# Patient Record
Sex: MALE | Race: WHITE | Employment: FULL TIME | URBAN - METROPOLITAN AREA
[De-identification: names, ages, dates, MRNs, and addresses within clinical notes are randomized per-mention and may not be internally consistent; named-entity substitution may affect disease eponyms.]

---

## 2017-01-19 ENCOUNTER — TELEPHONE (OUTPATIENT)
Dept: NEUROLOGY | Facility: CLINIC | Age: 42
End: 2017-01-19

## 2017-01-19 NOTE — TELEPHONE ENCOUNTER
Patient with new insurance group and ID number. Group #: [id number]   Member ID#:  [id number]  New prior authorization required for current Copaxone prescription. Current prescription good through Sept. 2017. Clinical questions answered on phone.

## 2017-02-07 ENCOUNTER — TELEPHONE (OUTPATIENT)
Dept: NEUROLOGY | Facility: CLINIC | Age: 42
End: 2017-02-07

## 2017-03-17 ENCOUNTER — OFFICE VISIT (OUTPATIENT)
Dept: NEUROLOGY | Facility: CLINIC | Age: 42
End: 2017-03-17

## 2017-03-17 VITALS
HEIGHT: 68 IN | HEART RATE: 77 BPM | SYSTOLIC BLOOD PRESSURE: 130 MMHG | RESPIRATION RATE: 16 BRPM | WEIGHT: 150 LBS | BODY MASS INDEX: 22.73 KG/M2 | DIASTOLIC BLOOD PRESSURE: 86 MMHG

## 2017-03-17 DIAGNOSIS — G35 MS (MULTIPLE SCLEROSIS) (HCC): Primary | ICD-10-CM

## 2017-03-17 PROCEDURE — 99213 OFFICE O/P EST LOW 20 MIN: CPT | Performed by: OTHER

## 2017-03-17 RX ORDER — GLATIRAMER 40 MG/ML
40 INJECTION, SOLUTION SUBCUTANEOUS
Qty: 12 SYRINGE | Refills: 11 | Status: SHIPPED | OUTPATIENT
Start: 2017-03-17 | End: 2017-08-16

## 2017-03-17 NOTE — PATIENT INSTRUCTIONS
Refill policies:    • Allow 2 business days for refills; controlled substances may take longer.   • Contact your pharmacy at least 5 days prior to running out of medication and have them send an electronic request or submit request through the “request re your physician has recommended that you have a procedure or additional testing performed. [facility]) will contact your insurance carrier to obtain pre-certification or prior authorization.     Unfortunately, NEO has seen an increas

## 2017-03-23 NOTE — PROGRESS NOTES
[id number] [facility] with [facility]  3/17/2017    10:51 AM      Followed for MS    No recurring symptoms  Relates one injection site lump right gluteal area   No problems with injections otherwise  No new sympt diagnosis)    Advised regarding injections  Continue Copaxone  MRI BRAIN (W+WO) (CPT=70553) [[id number]]  MRI SPINE CERVICAL (W+WO) (CPT=72156) [[id number]]      RTC 6 months      Taina Morrissey MD  Vascular & General Neurology  Director, Multiple Sclerosis Pro

## 2017-03-28 ENCOUNTER — TELEPHONE (OUTPATIENT)
Dept: NEUROLOGY | Facility: CLINIC | Age: 42
End: 2017-03-28

## 2017-03-28 NOTE — TELEPHONE ENCOUNTER
Left message at home number, MRI Brain/Cervical ordered by Dr. Pauly Huggins has been approved by your insurance, authorization # [id number] and is approved through 3.27.17-6.25.17. Please have procedure completed before 6.25.17.  Pt will be having test done at

## 2017-04-18 ENCOUNTER — OFFICE VISIT (OUTPATIENT)
Dept: NEUROLOGY | Facility: CLINIC | Age: 42
End: 2017-04-18

## 2017-04-18 VITALS — HEART RATE: 82 BPM | DIASTOLIC BLOOD PRESSURE: 74 MMHG | RESPIRATION RATE: 18 BRPM | SYSTOLIC BLOOD PRESSURE: 122 MMHG

## 2017-04-18 DIAGNOSIS — G35 MS (MULTIPLE SCLEROSIS) (HCC): Primary | ICD-10-CM

## 2017-04-18 DIAGNOSIS — T80.90XD INJECTION SITE REACTION, SUBSEQUENT ENCOUNTER: ICD-10-CM

## 2017-04-18 PROCEDURE — 99214 OFFICE O/P EST MOD 30 MIN: CPT | Performed by: PHYSICIAN ASSISTANT

## 2017-04-18 RX ORDER — METHYLPREDNISOLONE 4 MG/1
TABLET ORAL
Qty: 1 PACKAGE | Refills: 0 | Status: SHIPPED | OUTPATIENT
Start: 2017-04-18 | End: 2017-07-13 | Stop reason: ALTCHOICE

## 2017-04-18 NOTE — PROGRESS NOTES
HPI:    Patient ID: Ijeoma Bowling is a 42year old male. HPI    Patient is a 42year old male here for follow-up of MS. He has been on the copaxone since 2012. He was last seen in the office about a month ago.  At that time MRI's were ordered and he com (VITAMIN D) 2000 UNITS Oral Tab Take 1 tablet by mouth daily. Disp:  Rfl:    Vitamin B-12 (VITAMIN B12) 1000 MCG Oral Tab Take 2,000 mcg by mouth daily.  Disp:  Rfl:    Fluticasone (FLOVENT DISKUS) 50 MCG/BLIST Inhalation Aerosol Powder, Breath Activated In be stable. ASSESSMENT/PLAN:   Ms (multiple sclerosis) (hcc)  (primary encounter diagnosis)  Injection site reaction, subsequent encounter    MS- continue the copaxone. Injection Site Reaction- ESR ordered.  Once lab test is completed can start on t

## 2017-04-18 NOTE — PATIENT INSTRUCTIONS
Refill policies:    • Allow 2 business days for refills; controlled substances may take longer.   • Contact your pharmacy at least 5 days prior to running out of medication and have them send an electronic request or submit request through the “request re insurance carrier to obtain pre-certification or prior authorization. Unfortunately, NEO has seen an increase in denial of payment even though the procedure/test has been pre-certified.   You are strongly encouraged to contact your insurance carrier to v

## 2017-04-19 ENCOUNTER — APPOINTMENT (OUTPATIENT)
Dept: LAB | Age: 42
End: 2017-04-19
Attending: PHYSICIAN ASSISTANT
Payer: COMMERCIAL

## 2017-04-19 DIAGNOSIS — G35 MS (MULTIPLE SCLEROSIS) (HCC): ICD-10-CM

## 2017-04-19 PROCEDURE — 36415 COLL VENOUS BLD VENIPUNCTURE: CPT

## 2017-04-19 PROCEDURE — 85652 RBC SED RATE AUTOMATED: CPT

## 2017-04-20 ENCOUNTER — TELEPHONE (OUTPATIENT)
Dept: NEUROLOGY | Facility: CLINIC | Age: 42
End: 2017-04-20

## 2017-04-20 NOTE — TELEPHONE ENCOUNTER
Called  Dr Luis Kolb office [phone], nurse said she did not check the fax machine for report, she will call us if needed.

## 2017-04-20 NOTE — TELEPHONE ENCOUNTER
Patient informed that ESR was normal. Patient informed that MRI cervical spine did show a lesion on the thyroid gland. Patient instructed to follow-up with pcp for furth recommendations in regards to the lesion on the thyroid gland.  Still waiting on radiol

## 2017-04-21 PROBLEM — E04.1 THYROID NODULE: Status: ACTIVE | Noted: 2017-04-21

## 2017-04-21 PROBLEM — N52.9 ERECTILE DYSFUNCTION, UNSPECIFIED ERECTILE DYSFUNCTION TYPE: Status: ACTIVE | Noted: 2017-04-21

## 2017-04-24 ENCOUNTER — TELEPHONE (OUTPATIENT)
Dept: NEUROLOGY | Facility: CLINIC | Age: 42
End: 2017-04-24

## 2017-04-24 NOTE — TELEPHONE ENCOUNTER
Patient had expressed questions about brain and cervical MRI changes between 2016 and 2017. Patient had most recent MRIs done at [facility]; previous imaging was done at [facility] on 05/27/16. Imaging prior to that was done at [facility].   Comparison imaging currently a

## 2017-04-27 ENCOUNTER — TELEPHONE (OUTPATIENT)
Dept: NEUROLOGY | Facility: CLINIC | Age: 42
End: 2017-04-27

## 2017-04-27 DIAGNOSIS — G35 MS (MULTIPLE SCLEROSIS) (HCC): Primary | ICD-10-CM

## 2017-04-27 RX ORDER — METHYLPREDNISOLONE 4 MG/1
TABLET ORAL
Qty: 1 KIT | Refills: 0 | Status: SHIPPED | OUTPATIENT
Start: 2017-04-27 | End: 2017-09-08 | Stop reason: ALTCHOICE

## 2017-04-27 NOTE — TELEPHONE ENCOUNTER
MDP seemed to help but after completion of dosepak, 3-4 days later, his reaction starting to return (swelling, redness). Also reporting a lingering a headache, likening it to a hangover, for past 2 weeks.  He states that this is usual start to the flare-

## 2017-05-02 ENCOUNTER — TELEPHONE (OUTPATIENT)
Dept: NEUROLOGY | Facility: CLINIC | Age: 42
End: 2017-05-02

## 2017-05-03 ENCOUNTER — TELEPHONE (OUTPATIENT)
Dept: NEUROLOGY | Facility: CLINIC | Age: 42
End: 2017-05-03

## 2017-05-03 NOTE — TELEPHONE ENCOUNTER
Wei Rosado states that he received a voicemail from Kong Martin stating that she dropped the CD of prior MRI to [facility] but it should have gone to [facility]. He states he has never had MRI at [facility], only at [facility] in [facility].     Will discuss next steps wit

## 2017-05-04 ENCOUNTER — TELEPHONE (OUTPATIENT)
Dept: NEUROLOGY | Facility: CLINIC | Age: 42
End: 2017-05-04

## 2017-05-04 NOTE — TELEPHONE ENCOUNTER
Left voice-mail message informing patient that I did receive a revision to his MRI report from [facility] stating that it was compared to the [facility] MRI done may 2016 and it was stable.

## 2017-05-04 NOTE — TELEPHONE ENCOUNTER
See 5/4/17 encounter from with [facility]. [facility] is a [address] [place] [address] facility and the comparative imaging was completed.

## 2017-05-05 ENCOUNTER — APPOINTMENT (OUTPATIENT)
Dept: LAB | Age: 42
End: 2017-05-05
Attending: INTERNAL MEDICINE
Payer: COMMERCIAL

## 2017-05-05 DIAGNOSIS — D35.1 PARATHYROID ADENOMA: ICD-10-CM

## 2017-05-05 DIAGNOSIS — N52.9 ERECTILE DYSFUNCTION, UNSPECIFIED ERECTILE DYSFUNCTION TYPE: ICD-10-CM

## 2017-05-05 DIAGNOSIS — E04.1 THYROID NODULE: ICD-10-CM

## 2017-05-05 PROCEDURE — 36415 COLL VENOUS BLD VENIPUNCTURE: CPT

## 2017-05-05 PROCEDURE — 84443 ASSAY THYROID STIM HORMONE: CPT

## 2017-05-05 PROCEDURE — 80053 COMPREHEN METABOLIC PANEL: CPT

## 2017-05-05 PROCEDURE — 84402 ASSAY OF FREE TESTOSTERONE: CPT

## 2017-05-05 PROCEDURE — 83970 ASSAY OF PARATHORMONE: CPT

## 2017-05-05 PROCEDURE — 84403 ASSAY OF TOTAL TESTOSTERONE: CPT

## 2017-06-08 ENCOUNTER — TELEPHONE (OUTPATIENT)
Dept: NEUROLOGY | Facility: CLINIC | Age: 42
End: 2017-06-08

## 2017-06-08 DIAGNOSIS — T80.90XA INJECTION SITE REACTION, INITIAL ENCOUNTER: Primary | ICD-10-CM

## 2017-06-08 RX ORDER — METHYLPREDNISOLONE 4 MG/1
TABLET ORAL
Qty: 1 PACKAGE | Refills: 0 | Status: SHIPPED | OUTPATIENT
Start: 2017-06-08 | End: 2017-09-08 | Stop reason: ALTCHOICE

## 2017-06-08 NOTE — TELEPHONE ENCOUNTER
Left voice-mail message informing patient that we can try medrol dose octavio for his injection site reaction. Informed him that we can not continue to do this over and over.  So if he continues to have issues with injection site reactions we may need to consid

## 2017-06-08 NOTE — TELEPHONE ENCOUNTER
Negrito Redding calling to report that the injection site reactions continue to persist with his Copaxone injections. He noted significant improvement with the steroids Jayne gave, however they have gradually gotten worse and they are now intolerable.      He feels th

## 2017-06-09 RX ORDER — MELOXICAM 7.5 MG/1
7.5 TABLET ORAL DAILY
Qty: 15 TABLET | Refills: 0 | Status: SHIPPED | OUTPATIENT
Start: 2017-06-09 | End: 2018-09-27

## 2017-06-09 NOTE — TELEPHONE ENCOUNTER
Kevin Nieves returns call; he verbalizes understanding. He notes that only one injection reaction has occurred; however it has not completely resolved. Prior steroid packs have helped, but have not resolved his reaction site.    He is looking for something specific

## 2017-06-26 ENCOUNTER — TELEPHONE (OUTPATIENT)
Dept: NEUROLOGY | Facility: CLINIC | Age: 42
End: 2017-06-26

## 2017-06-26 NOTE — TELEPHONE ENCOUNTER
Spoke to patient on the phone and the meloxicam relieved his discomfort but the redness never cleared up. He is now out of the meloxicam and the discomfort is returning. Recommended consult with dermatology.  He has a dermatologist and will make an appointm

## 2017-08-16 DIAGNOSIS — G35 MS (MULTIPLE SCLEROSIS) (HCC): ICD-10-CM

## 2017-08-16 RX ORDER — GLATIRAMER ACETATE 40 MG/ML
INJECTION, SOLUTION SUBCUTANEOUS
Qty: 12 ML | Refills: 10 | Status: SHIPPED | OUTPATIENT
Start: 2017-08-16 | End: 2018-09-27

## 2017-08-16 NOTE — TELEPHONE ENCOUNTER
Medication: Copaxone 40 mg    Date of last refill: 3/17/17 with 11 addt refills (sent to [facility])  [place]     Last office visit: 04/18/17  Due back to clinic per last office note:  RTN in 6 months  Date next office vi

## 2017-09-08 ENCOUNTER — OFFICE VISIT (OUTPATIENT)
Dept: NEUROLOGY | Facility: CLINIC | Age: 42
End: 2017-09-08

## 2017-09-08 VITALS
RESPIRATION RATE: 16 BRPM | WEIGHT: 142 LBS | HEART RATE: 74 BPM | DIASTOLIC BLOOD PRESSURE: 73 MMHG | SYSTOLIC BLOOD PRESSURE: 112 MMHG | HEIGHT: 68 IN | BODY MASS INDEX: 21.52 KG/M2

## 2017-09-08 DIAGNOSIS — T14.8XXA NONHEALING NONSURGICAL WOUND WITH NECROSIS OF MUSCLE: ICD-10-CM

## 2017-09-08 DIAGNOSIS — M62.89 NONHEALING NONSURGICAL WOUND WITH NECROSIS OF MUSCLE: ICD-10-CM

## 2017-09-08 DIAGNOSIS — G35 MS (MULTIPLE SCLEROSIS) (HCC): Primary | ICD-10-CM

## 2017-09-08 PROCEDURE — 99214 OFFICE O/P EST MOD 30 MIN: CPT | Performed by: OTHER

## 2017-09-08 NOTE — PATIENT INSTRUCTIONS
Refill policies:    • Allow 2-3 business days for refills; controlled substances may take longer.   • Contact your pharmacy at least 5 days prior to running out of medication and have them send an electronic request or submit request through the [facility] have a procedure or additional testing performed. [facility]) will contact your insurance carrier to obtain pre-certification or prior authorization.     Unfortunately, NEO has seen an increase in denial of payment even though the p

## 2017-09-08 NOTE — PROGRESS NOTES
[facility] with [address]  5/1/1975  Primary Care Provider:  Dalia Gregg MD    9/8/2017    36year old yo patient being seen for:  MS    Lump was treated with steroid and lump mad total) by mouth as needed for Erectile Dysfunction. , Disp: 10 tablet, Rfl: 3  •  fluticasone-salmeterol (ADVAIR DISKUS) 100-50 MCG/DOSE Inhalation Aerosol Powder, Breath Activated, Inhale 1 puff into the lungs daily. , Disp: , Rfl:   •  Cholecalciferol (VIT each other. Resolved the issue of the MRI'  Repeat or follow up MRI next year    Diagnostics:   Follow up MRI next year    Treatment:  Same copaxone    Follow up, in approximately:  ( ) 6-8 weeks    ( ) 3-4 months     (x) 6-8 months   ( ) yearly   ( ) As

## 2017-09-11 PROCEDURE — 87206 SMEAR FLUORESCENT/ACID STAI: CPT | Performed by: PHYSICIAN ASSISTANT

## 2017-09-11 PROCEDURE — 87102 FUNGUS ISOLATION CULTURE: CPT | Performed by: PHYSICIAN ASSISTANT

## 2017-09-11 PROCEDURE — 87070 CULTURE OTHR SPECIMN AEROBIC: CPT | Performed by: PHYSICIAN ASSISTANT

## 2017-09-11 PROCEDURE — 87075 CULTR BACTERIA EXCEPT BLOOD: CPT | Performed by: PHYSICIAN ASSISTANT

## 2017-09-11 PROCEDURE — 87116 MYCOBACTERIA CULTURE: CPT | Performed by: PHYSICIAN ASSISTANT

## 2017-09-11 PROCEDURE — 87205 SMEAR GRAM STAIN: CPT | Performed by: PHYSICIAN ASSISTANT

## 2017-11-13 ENCOUNTER — TELEPHONE (OUTPATIENT)
Dept: NEUROLOGY | Facility: CLINIC | Age: 42
End: 2017-11-13

## 2017-11-13 DIAGNOSIS — G35 MS (MULTIPLE SCLEROSIS) (HCC): ICD-10-CM

## 2017-11-13 RX ORDER — GLATIRAMER ACETATE 40 MG/ML
40 INJECTION, SOLUTION SUBCUTANEOUS
Qty: 12 SYRINGE | Refills: 11 | Status: SHIPPED
Start: 2017-11-13 | End: 2018-03-20

## 2017-11-13 NOTE — TELEPHONE ENCOUNTER
Per pt, please fax [phone] or call-in [phone], Copaxone refill to [facility] and mary jane as Brand Only.

## 2017-11-13 NOTE — TELEPHONE ENCOUNTER
Prescription reprinted with SHEN, [facility] [phone] with receipt confirmation. Patient notified that prescription has been faxed.

## 2017-11-13 NOTE — TELEPHONE ENCOUNTER
Per pt, [facility] specialty sent us a form to switch copaxone to the generic. Form has not been received and suggested pt contact them to resend it. Pt wants to stay on Brand Name only as he is receiving Teva copay assistance.   He will be going on cobra so

## 2018-03-19 ENCOUNTER — TELEPHONE (OUTPATIENT)
Dept: NEUROLOGY | Facility: CLINIC | Age: 43
End: 2018-03-19

## 2018-03-19 DIAGNOSIS — G35 MS (MULTIPLE SCLEROSIS) (HCC): Primary | ICD-10-CM

## 2018-03-19 NOTE — TELEPHONE ENCOUNTER
Pt now has [employer] Access and needs a new Brand Specific Copaxone prior auth. Ins updated on account, RX Bin: [id number], RX PCN:  [id number]. Once authorized, please send to [address], fax # [phone].   Please notify pt when PA has been submitted

## 2018-03-20 RX ORDER — GLATIRAMER ACETATE 40 MG/ML
40 INJECTION, SOLUTION SUBCUTANEOUS
Qty: 12 SYRINGE | Refills: 11 | Status: SHIPPED | OUTPATIENT
Start: 2018-03-21 | End: 2018-09-27

## 2018-03-20 NOTE — TELEPHONE ENCOUNTER
Received approval for Copaxone 40 mg effective 3/20/18 - 3/20/19. Ref # [id number]. Script forwarded to Mariajose Bernabe. Patient informed, verbalized understanding.

## 2018-05-29 ENCOUNTER — TELEPHONE (OUTPATIENT)
Dept: NEUROLOGY | Facility: CLINIC | Age: 43
End: 2018-05-29

## 2018-05-29 NOTE — TELEPHONE ENCOUNTER
Received notification of denial of coverage for Copaxone 40 mg; however at bottom of page was noted \"on 3/20/18 we approved coverage of Copaxone for a period ending on 03/20/2019\".  Called to clarify and in system Carrol Jama has approval. Last filled on 5/15/1

## 2018-06-04 NOTE — TELEPHONE ENCOUNTER
Patient calling with questions regarding his prior authorization for copaxone 40 mg. Stated that he received a letter stating a denial for the medication.   Informed patient of information below regarding call to M[facility] and denial for \"fill to

## 2018-07-31 ENCOUNTER — TELEPHONE (OUTPATIENT)
Dept: NEUROLOGY | Facility: CLINIC | Age: 43
End: 2018-07-31

## 2018-07-31 NOTE — TELEPHONE ENCOUNTER
Fax received from [address] [place] [address], patient requires a new prior authorization. Paperwork completed and faxed with receipt confirmation. Patient has been on Copaxone 40 mg since 4/2016. Case pending.

## 2018-08-14 NOTE — TELEPHONE ENCOUNTER
Patient is willing to try the generic glatopa. Recommended he schedule a follow-up appointment in a few weeks to see how it is going on the generic. He expressed full understanding.

## 2018-08-14 NOTE — TELEPHONE ENCOUNTER
Received denial for Copaxone, noting patient requires trial to 1 generic formulation. Will request next steps from Cornelia Whitfield.

## 2018-08-15 RX ORDER — GLATIRAMER ACETATE 40 MG/ML
40 INJECTION, SOLUTION SUBCUTANEOUS
Qty: 12 SYRINGE | Refills: 11 | COMMUNITY
Start: 2018-08-15 | End: 2019-01-11

## 2018-08-15 NOTE — TELEPHONE ENCOUNTER
Clarified with Marquise Scott that patient requires Dominic Durbin for insurance coverage. Epic updated to reflect. Patient informed.

## 2018-08-21 ENCOUNTER — TELEPHONE (OUTPATIENT)
Dept: NEUROLOGY | Facility: CLINIC | Age: 43
End: 2018-08-21

## 2018-08-21 NOTE — TELEPHONE ENCOUNTER
LM with Christiano Magaña that we do not have experience with interchanging glatopa/copaxone auto-injectors and pre-filled syringes. He can try injecting glatopa manually if he has difficulty obtaining the injector kit from [employer].

## 2018-09-27 ENCOUNTER — OFFICE VISIT (OUTPATIENT)
Dept: NEUROLOGY | Facility: CLINIC | Age: 43
End: 2018-09-27
Payer: COMMERCIAL

## 2018-09-27 VITALS — SYSTOLIC BLOOD PRESSURE: 124 MMHG | HEART RATE: 70 BPM | DIASTOLIC BLOOD PRESSURE: 72 MMHG | RESPIRATION RATE: 18 BRPM

## 2018-09-27 DIAGNOSIS — G35 MS (MULTIPLE SCLEROSIS) (HCC): Primary | ICD-10-CM

## 2018-09-27 DIAGNOSIS — G37.3 ACUTE TRANSVERSE MYELITIS IN DEMYELINATING DISEASE OF CENTRAL NERVOUS SYSTEM (HCC): ICD-10-CM

## 2018-09-27 PROCEDURE — 99214 OFFICE O/P EST MOD 30 MIN: CPT | Performed by: OTHER

## 2018-09-27 RX ORDER — FLUTICASONE PROPIONATE 50 MCG
SPRAY, SUSPENSION (ML) NASAL
COMMUNITY
Start: 2015-06-12

## 2018-09-27 NOTE — PROGRESS NOTES
[facility] with [address]  5/1/1975  Primary Care Provider:  Omar Cantor MD    9/27/2018  Accompanied visit:  (x) No ( ) yes    39year old yo patient being seen for:  MS    Doing 79   Resp 25   Looks stated age  Pink conjunctiva anicteric sclerae, moist mucosa  No LAD, neck supple  Lungs clear breath sounds  Heart S1S2, no abnormal sounds  Pink nailbeds no Cyanosis, pulses palpated    Attention, reasoning, memory and comprehension making:  (  ) labs reviewed/ordered - 1  (  ) new diagnosis: - 1  (  ) Images & studies independently reviewed -non F2F  (  ) Case/studies discussed with other caregivers - -non F2F  (  ) Telephone time with patiern or authorized [employer]  (  )

## 2018-09-27 NOTE — PATIENT INSTRUCTIONS
Refill policies:    • Allow 2-3 business days for refills; controlled substances may take longer.   • Contact your pharmacy at least 5 days prior to running out of medication and have them send an electronic request or submit request through the “request re entire amount billed. Precertification and Prior Authorizations: If your physician has recommended that you have a procedure or additional testing performed.   [facility]) will contact your insurance carrier to obtain pre-certi

## 2018-12-18 ENCOUNTER — TELEPHONE (OUTPATIENT)
Dept: NEUROLOGY | Facility: CLINIC | Age: 43
End: 2018-12-18

## 2018-12-18 RX ORDER — METHYLPREDNISOLONE 4 MG/1
TABLET ORAL
Qty: 1 PACKAGE | Refills: 0 | Status: SHIPPED | OUTPATIENT
Start: 2018-12-18 | End: 2019-01-11 | Stop reason: ALTCHOICE

## 2018-12-18 NOTE — TELEPHONE ENCOUNTER
Pt called via answering service. States that he has had another injection site reaction to his MS medications. He reports that this has happened in the past and is similar. Mildly painful and uncomfortable.  Reports that he has had good response to medrol d

## 2018-12-19 NOTE — TELEPHONE ENCOUNTER
Pt spoke to on call neurologist, Dr. Chyna Abreu, last night who prescribed prednisone for his injection site reaction. Pt scheduled a f/up appt with Dr. Concepcion River for 1/11/19 to discuss alternative tx options.   Per pt, it is not necessary to return his call

## 2019-01-11 ENCOUNTER — TELEPHONE (OUTPATIENT)
Dept: NEUROLOGY | Facility: CLINIC | Age: 44
End: 2019-01-11

## 2019-01-11 ENCOUNTER — OFFICE VISIT (OUTPATIENT)
Dept: NEUROLOGY | Facility: CLINIC | Age: 44
End: 2019-01-11
Payer: COMMERCIAL

## 2019-01-11 VITALS
HEART RATE: 78 BPM | HEIGHT: 68 IN | WEIGHT: 142 LBS | RESPIRATION RATE: 16 BRPM | BODY MASS INDEX: 21.52 KG/M2 | DIASTOLIC BLOOD PRESSURE: 70 MMHG | SYSTOLIC BLOOD PRESSURE: 124 MMHG

## 2019-01-11 DIAGNOSIS — G35 MS (MULTIPLE SCLEROSIS) (HCC): Primary | ICD-10-CM

## 2019-01-11 DIAGNOSIS — T80.90XA INJECTION SITE REACTION, INITIAL ENCOUNTER: ICD-10-CM

## 2019-01-11 PROCEDURE — 99214 OFFICE O/P EST MOD 30 MIN: CPT | Performed by: OTHER

## 2019-01-11 RX ORDER — PREDNISONE 20 MG/1
20 TABLET ORAL
COMMUNITY
End: 2019-02-08

## 2019-01-11 RX ORDER — PREDNISONE 20 MG/1
TABLET ORAL
Qty: 30 TABLET | Refills: 0 | Status: SHIPPED | OUTPATIENT
Start: 2019-01-11 | End: 2019-01-11 | Stop reason: ALTCHOICE

## 2019-01-11 NOTE — TELEPHONE ENCOUNTER
At office visit patient to initiate Aubagio therapy and discontinue Glatopa. Start form signed. Labwork in system per Dr. Gabriele Arias.

## 2019-01-11 NOTE — PROGRESS NOTES
[facility] with [address]  5/1/1975  Primary Care Provider:  Donnie Chavez MD    1/11/2019  Accompanied visit:  (x) No ( ) yes, by:      39year old yo patient being seen for:  MS a 124/70 (BP Location: Left arm, Patient Position: Sitting, Cuff Size: large)   Pulse 78   Resp 16   Ht 68\"   Wt 142 lb   BMI 21.59 kg/m²   Looks stated age  Pink conjunctiva anicteric sclerae, moist mucosa  No LAD, neck supple  Lungs clear breath sounds  H or authorized [employer]  (  ) other records reviewed --non F2F  (  ) Fam meetings - patient not present --non F2F  Non Face to Face CPT code 41152/92649 applies as documented above    PROCEDURE DONE     (   ) see notes  Visits are done with \"ope

## 2019-01-12 ENCOUNTER — APPOINTMENT (OUTPATIENT)
Dept: LAB | Facility: HOSPITAL | Age: 44
End: 2019-01-12
Attending: Other
Payer: COMMERCIAL

## 2019-01-12 DIAGNOSIS — T80.90XA INJECTION SITE REACTION, INITIAL ENCOUNTER: ICD-10-CM

## 2019-01-12 DIAGNOSIS — G35 MS (MULTIPLE SCLEROSIS) (HCC): ICD-10-CM

## 2019-01-12 LAB
ALBUMIN SERPL-MCNC: 3.7 G/DL (ref 3.1–4.5)
ALP LIVER SERPL-CCNC: 52 U/L (ref 45–117)
ALT SERPL-CCNC: 24 U/L (ref 17–63)
AST SERPL-CCNC: 14 U/L (ref 15–41)
BILIRUB DIRECT SERPL-MCNC: 0.1 MG/DL (ref 0.1–0.5)
BILIRUB SERPL-MCNC: 0.8 MG/DL (ref 0.1–2)
M PROTEIN MFR SERPL ELPH: 6.8 G/DL (ref 6.4–8.2)

## 2019-01-12 PROCEDURE — 80076 HEPATIC FUNCTION PANEL: CPT

## 2019-01-12 PROCEDURE — 36415 COLL VENOUS BLD VENIPUNCTURE: CPT

## 2019-01-12 PROCEDURE — 86480 TB TEST CELL IMMUN MEASURE: CPT

## 2019-01-14 NOTE — TELEPHONE ENCOUNTER
Initiated PA with CMM for Aubagio, submitted, pending, key: [id number]    Received auto-reply: Available without authorization.

## 2019-01-15 LAB
M TB TUBERC IFN-G BLD QL: NEGATIVE
M TB TUBERC IFN-G/MITOGEN IGNF BLD: 0 IU/ML
M TB TUBERC IFN-G/MITOGEN IGNF BLD: 0 IU/ML
M TB TUBERC IGNF/MITOGEN IGNF CONTROL: 7.44 IU/ML
MITOGEN IGNF BCKGRD COR BLD-ACNC: 0.02 IU/ML

## 2019-02-08 ENCOUNTER — TELEPHONE (OUTPATIENT)
Dept: NEUROLOGY | Facility: CLINIC | Age: 44
End: 2019-02-08

## 2019-02-08 PROBLEM — D35.1 PARATHYROID ADENOMA: Status: ACTIVE | Noted: 2019-02-08

## 2019-02-08 NOTE — TELEPHONE ENCOUNTER
Pt calling, currently at PCP office and needs to get a tetanus shot. Wants to ensure it is ok given his dx of MS and that he is taking Aubagio. Per [employer], Tetanus vaccine is considered safe so long as it is inactivated.   Per Juan Pablo Lopez

## 2019-03-01 ENCOUNTER — APPOINTMENT (OUTPATIENT)
Dept: LAB | Facility: HOSPITAL | Age: 44
End: 2019-03-01
Attending: Other
Payer: COMMERCIAL

## 2019-03-01 DIAGNOSIS — G35 MULTIPLE SCLEROSIS (HCC): ICD-10-CM

## 2019-03-01 LAB
ALBUMIN SERPL-MCNC: 4.1 G/DL (ref 3.4–5)
ALP LIVER SERPL-CCNC: 50 U/L (ref 45–117)
ALT SERPL-CCNC: 54 U/L (ref 16–61)
AST SERPL-CCNC: 21 U/L (ref 15–37)
BILIRUB DIRECT SERPL-MCNC: 0.2 MG/DL (ref 0–0.2)
BILIRUB SERPL-MCNC: 0.6 MG/DL (ref 0.1–2)
M PROTEIN MFR SERPL ELPH: 7.1 G/DL (ref 6.4–8.2)

## 2019-03-01 PROCEDURE — 36415 COLL VENOUS BLD VENIPUNCTURE: CPT

## 2019-03-01 PROCEDURE — 80076 HEPATIC FUNCTION PANEL: CPT

## 2019-04-01 ENCOUNTER — TELEPHONE (OUTPATIENT)
Dept: NEUROLOGY | Facility: CLINIC | Age: 44
End: 2019-04-01

## 2019-04-04 ENCOUNTER — APPOINTMENT (OUTPATIENT)
Dept: LAB | Facility: HOSPITAL | Age: 44
End: 2019-04-04
Attending: Other
Payer: COMMERCIAL

## 2019-04-04 DIAGNOSIS — G35 MULTIPLE SCLEROSIS (HCC): ICD-10-CM

## 2019-04-04 PROCEDURE — 80076 HEPATIC FUNCTION PANEL: CPT

## 2019-04-04 PROCEDURE — 36415 COLL VENOUS BLD VENIPUNCTURE: CPT

## 2019-04-29 ENCOUNTER — APPOINTMENT (OUTPATIENT)
Dept: LAB | Facility: HOSPITAL | Age: 44
End: 2019-04-29
Attending: Other
Payer: COMMERCIAL

## 2019-04-29 DIAGNOSIS — G35 MULTIPLE SCLEROSIS (HCC): ICD-10-CM

## 2019-04-29 PROCEDURE — 36415 COLL VENOUS BLD VENIPUNCTURE: CPT

## 2019-04-29 PROCEDURE — 80076 HEPATIC FUNCTION PANEL: CPT

## 2019-05-01 ENCOUNTER — TELEPHONE (OUTPATIENT)
Dept: NEUROLOGY | Facility: CLINIC | Age: 44
End: 2019-05-01

## 2019-05-29 ENCOUNTER — TELEPHONE (OUTPATIENT)
Dept: NEUROLOGY | Facility: CLINIC | Age: 44
End: 2019-05-29

## 2019-05-30 ENCOUNTER — APPOINTMENT (OUTPATIENT)
Dept: LAB | Facility: HOSPITAL | Age: 44
End: 2019-05-30
Attending: Other
Payer: COMMERCIAL

## 2019-05-30 DIAGNOSIS — G35 MULTIPLE SCLEROSIS (HCC): ICD-10-CM

## 2019-05-30 PROCEDURE — 80076 HEPATIC FUNCTION PANEL: CPT

## 2019-05-30 PROCEDURE — 36415 COLL VENOUS BLD VENIPUNCTURE: CPT

## 2019-06-05 ENCOUNTER — TELEPHONE (OUTPATIENT)
Dept: NEUROLOGY | Facility: CLINIC | Age: 44
End: 2019-06-05

## 2019-06-05 NOTE — TELEPHONE ENCOUNTER
Pt received copy of [place] MRI CD. Advised pt he can bring it in with his appt. Pt stated he would prefer to drop it off and upload while he waits. Advised some CD's may not upload and will be sent to radiology. Pt understood.

## 2019-06-05 NOTE — TELEPHONE ENCOUNTER
JANELLE for pt explaining he needs to contact [facility] to obtain a CD of his recent MRI. Obtaining a copy is the pt's responsibility and [facility] would need his permission to release.

## 2019-06-24 ENCOUNTER — APPOINTMENT (OUTPATIENT)
Dept: LAB | Facility: HOSPITAL | Age: 44
End: 2019-06-24
Attending: Other
Payer: COMMERCIAL

## 2019-06-24 DIAGNOSIS — G35 MULTIPLE SCLEROSIS (HCC): ICD-10-CM

## 2019-06-24 PROCEDURE — 80076 HEPATIC FUNCTION PANEL: CPT

## 2019-06-24 PROCEDURE — 36415 COLL VENOUS BLD VENIPUNCTURE: CPT

## 2019-06-27 ENCOUNTER — OFFICE VISIT (OUTPATIENT)
Dept: NEUROLOGY | Facility: CLINIC | Age: 44
End: 2019-06-27
Payer: COMMERCIAL

## 2019-06-27 VITALS
HEIGHT: 68 IN | RESPIRATION RATE: 15 BRPM | SYSTOLIC BLOOD PRESSURE: 115 MMHG | HEART RATE: 69 BPM | DIASTOLIC BLOOD PRESSURE: 77 MMHG | WEIGHT: 144 LBS | BODY MASS INDEX: 21.82 KG/M2

## 2019-06-27 DIAGNOSIS — G35 MULTIPLE SCLEROSIS (HCC): Primary | ICD-10-CM

## 2019-06-27 DIAGNOSIS — G35 MS (MULTIPLE SCLEROSIS) (HCC): ICD-10-CM

## 2019-06-27 PROCEDURE — 99213 OFFICE O/P EST LOW 20 MIN: CPT | Performed by: OTHER

## 2019-10-22 ENCOUNTER — TELEPHONE (OUTPATIENT)
Dept: NEUROLOGY | Facility: CLINIC | Age: 44
End: 2019-10-22

## 2019-10-22 DIAGNOSIS — G35 MS (MULTIPLE SCLEROSIS) (HCC): ICD-10-CM

## 2019-10-22 NOTE — TELEPHONE ENCOUNTER
Burak Navarro from [facility] confirming pt phone number on file. Informed we received Regis's notice about specialty pharmacy change and have updated the patient's chart. Burak Navarro is our [facility] , he is faxing over his information.

## 2019-10-22 NOTE — TELEPHONE ENCOUNTER
Fax received from Franchesca Payne. Information regarding change in patient's specialty pharmacy. Aubagio will now be dispensed from [address] Pharmacy updated in [address] New prescription sent to [facility].

## 2019-11-25 ENCOUNTER — TELEPHONE (OUTPATIENT)
Dept: NEUROLOGY | Facility: CLINIC | Age: 44
End: 2019-11-25

## 2019-11-25 NOTE — TELEPHONE ENCOUNTER
Lico notified to get CBC and hepatic function panel test done before December office. Patient verbalized the understanding.

## 2019-11-26 ENCOUNTER — LAB ENCOUNTER (OUTPATIENT)
Dept: LAB | Facility: HOSPITAL | Age: 44
End: 2019-11-26
Attending: Other
Payer: COMMERCIAL

## 2019-11-26 DIAGNOSIS — G35 MULTIPLE SCLEROSIS (HCC): ICD-10-CM

## 2019-11-26 PROCEDURE — 85025 COMPLETE CBC W/AUTO DIFF WBC: CPT

## 2019-11-26 PROCEDURE — 36415 COLL VENOUS BLD VENIPUNCTURE: CPT

## 2019-11-26 PROCEDURE — 80076 HEPATIC FUNCTION PANEL: CPT

## 2019-12-09 ENCOUNTER — OFFICE VISIT (OUTPATIENT)
Dept: NEUROLOGY | Facility: CLINIC | Age: 44
End: 2019-12-09
Payer: COMMERCIAL

## 2019-12-09 ENCOUNTER — TELEPHONE (OUTPATIENT)
Dept: NEUROLOGY | Facility: CLINIC | Age: 44
End: 2019-12-09

## 2019-12-09 VITALS
WEIGHT: 144 LBS | SYSTOLIC BLOOD PRESSURE: 120 MMHG | RESPIRATION RATE: 16 BRPM | HEART RATE: 74 BPM | DIASTOLIC BLOOD PRESSURE: 78 MMHG | HEIGHT: 68 IN | BODY MASS INDEX: 21.82 KG/M2

## 2019-12-09 DIAGNOSIS — G35 MS (MULTIPLE SCLEROSIS) (HCC): Primary | ICD-10-CM

## 2019-12-09 DIAGNOSIS — G35 MS (MULTIPLE SCLEROSIS) (HCC): ICD-10-CM

## 2019-12-09 PROCEDURE — 99214 OFFICE O/P EST MOD 30 MIN: CPT | Performed by: OTHER

## 2019-12-09 NOTE — TELEPHONE ENCOUNTER
Patient was here for an apt with Dr Ko Srivastava. He would like to get Aubagio for a year. RN advised patient that he can receive the aubagio for a year, but we need to check his LFT's every 6 months.  That is the reason provider prescribe aubagio for 6 mon

## 2019-12-09 NOTE — PROGRESS NOTES
[facility] with [address]  5/1/1975  Primary Care Provider:  Ottoniel Noble MD    12/9/2019  Accompanied visit:     (x) No.        38year old yo patient being seen for:  MS    Doing •  Multiple Vitamin (MULTI-VITAMIN OR), Take 1 tablet by mouth daily. , Disp: , Rfl:   PRN:     Allergies:  No Known Allergies         EXAM:  /78 (BP Location: Left arm, Patient Position: Sitting, Cuff Size: adult)   Pulse 74   Resp 16   Ht 68\"   W APN, PA, MA or PSR.   No specific problems or issues encountered or expressed by patient

## 2019-12-09 NOTE — PROGRESS NOTES
[facility] with [address]  5/1/1975  Primary Care Provider:  Elyssa Chang MD    12/9/2019  Accompanied visit: ***    (***) No.        38year old yo patient being seen for:  *** Allergies:  No Known Allergies         EXAM:  /78 (BP Location: Left arm, Patient Position: Sitting, Cuff Size: adult)   Pulse 74   Resp 16   Ht 68\"   Wt 144 lb (65.3 kg)   BMI 21.90 kg/m²   Looks stated age  Pink conjunctiva anicteric sclerae,

## 2020-01-13 ENCOUNTER — TELEPHONE (OUTPATIENT)
Dept: NEUROLOGY | Facility: CLINIC | Age: 45
End: 2020-01-13

## 2020-01-13 DIAGNOSIS — G35 MS (MULTIPLE SCLEROSIS) (HCC): Primary | ICD-10-CM

## 2020-01-13 NOTE — TELEPHONE ENCOUNTER
PA renewed for Aubagio 14 MG via CMM as requested by the patient. Send to plan. Waiting for determination.      Camelia Rogers (Roa: [id number])

## 2020-01-14 NOTE — TELEPHONE ENCOUNTER
Fax received from [employer]. Gabriela Moon has been approved. Authorization effective:  01/14/2020 - 01/13/2021.

## 2020-05-08 ENCOUNTER — TELEPHONE (OUTPATIENT)
Dept: NEUROLOGY | Facility: CLINIC | Age: 45
End: 2020-05-08

## 2020-05-11 DIAGNOSIS — G35 MS (MULTIPLE SCLEROSIS) (HCC): ICD-10-CM

## 2020-05-11 NOTE — TELEPHONE ENCOUNTER
RN spoke to the patient and informed him we could call him back shortly to set up an appt. Pt called for a refill on his Aubagio too.   RN will process that refill request.

## 2020-05-11 NOTE — TELEPHONE ENCOUNTER
Medication: Teriflunomide (AUBAGIO) 14 MG Oral Tab    Date of last refill: 12/9/2019 (#28/5)  [place]    Last office visit: 12/9/2019  Due back to clinic per last office note:  1 year  Date next office visit schedule

## 2020-05-14 ENCOUNTER — APPOINTMENT (OUTPATIENT)
Dept: LAB | Facility: HOSPITAL | Age: 45
End: 2020-05-14
Attending: Other
Payer: COMMERCIAL

## 2020-05-14 DIAGNOSIS — G35 MS (MULTIPLE SCLEROSIS) (HCC): ICD-10-CM

## 2020-05-14 PROCEDURE — 36415 COLL VENOUS BLD VENIPUNCTURE: CPT

## 2020-05-14 PROCEDURE — 80076 HEPATIC FUNCTION PANEL: CPT

## 2020-11-09 ENCOUNTER — TELEPHONE (OUTPATIENT)
Dept: NEUROLOGY | Facility: CLINIC | Age: 45
End: 2020-11-09

## 2020-11-09 DIAGNOSIS — G35 MS (MULTIPLE SCLEROSIS) (HCC): ICD-10-CM

## 2020-11-10 RX ORDER — RENAGEL 800 MG/1
14 TABLET ORAL DAILY
Qty: 30 TABLET | Refills: 5 | Status: SHIPPED | OUTPATIENT
Start: 2020-11-10 | End: 2021-01-12

## 2020-11-10 RX ORDER — RENAGEL 800 MG/1
14 TABLET ORAL DAILY
Qty: 30 TABLET | Refills: 5 | Status: SHIPPED | OUTPATIENT
Start: 2020-11-10 | End: 2020-11-10

## 2020-11-10 NOTE — TELEPHONE ENCOUNTER
Patient calling to report his Yahaira Gatica has been sent to local pharmacy, not the specialty pharmacy. Resstacey Emerson to Samantha.

## 2020-11-10 NOTE — TELEPHONE ENCOUNTER
Medication: Teriflunomide (AUBAGIO) 14 MG Oral Tab    Date of last refill: 10/26/2020 (#28/0)  [place] 03/10/0574    Last office visit: 12/19/2020  Due back to clinic per last office note:  1 year  Date next office visit

## 2020-12-11 ENCOUNTER — OFFICE VISIT (OUTPATIENT)
Dept: NEUROLOGY | Facility: CLINIC | Age: 45
End: 2020-12-11
Payer: COMMERCIAL

## 2020-12-11 ENCOUNTER — TELEPHONE (OUTPATIENT)
Dept: NEUROLOGY | Facility: CLINIC | Age: 45
End: 2020-12-11

## 2020-12-11 VITALS
HEIGHT: 68 IN | SYSTOLIC BLOOD PRESSURE: 120 MMHG | BODY MASS INDEX: 21.82 KG/M2 | HEART RATE: 93 BPM | RESPIRATION RATE: 18 BRPM | WEIGHT: 144 LBS | DIASTOLIC BLOOD PRESSURE: 80 MMHG

## 2020-12-11 DIAGNOSIS — G35 MS (MULTIPLE SCLEROSIS) (HCC): Primary | ICD-10-CM

## 2020-12-11 PROCEDURE — 3074F SYST BP LT 130 MM HG: CPT | Performed by: OTHER

## 2020-12-11 PROCEDURE — 3008F BODY MASS INDEX DOCD: CPT | Performed by: OTHER

## 2020-12-11 PROCEDURE — 3079F DIAST BP 80-89 MM HG: CPT | Performed by: OTHER

## 2020-12-11 PROCEDURE — 99214 OFFICE O/P EST MOD 30 MIN: CPT | Performed by: OTHER

## 2020-12-11 NOTE — PROGRESS NOTES
[facility] with [address]  5/1/1975  Primary Care Provider:  Adan Knight MD    12/11/2020  Accompanied visit:     (x) No.        54year old yo patient being seen for:  MS    Starr External Cream, Apply to AA on feet BID, Disp: 30 g, Rfl: 1  •  Cholecalciferol (VITAMIN D) 2000 UNITS Oral Tab, Take 1 tablet by mouth daily. , Disp: , Rfl:   •  Vitamin B-12 (VITAMIN B12) 1000 MCG Oral Tab, Take 2,000 mcg by mouth daily. , Disp: , Rfl:   • studies independently reviewed -non F2F  (  ) Case/studies discussed with other caregivers - -non F2F  (  ) Telephone time with patiern or authorized Fam member--non F2F  ( x ) other records reviewed --non F2F including consultations  (  ) [facility] meetings - p

## 2020-12-11 NOTE — TELEPHONE ENCOUNTER
Pt stated he will complete his MRI's at [facility] in 2021 after he gets his new insurance. Pt will call us with new insurance and then able to do the prior authorization.

## 2021-01-08 ENCOUNTER — TELEPHONE (OUTPATIENT)
Dept: NEUROLOGY | Facility: CLINIC | Age: 46
End: 2021-01-08

## 2021-01-08 DIAGNOSIS — G35 MS (MULTIPLE SCLEROSIS) (HCC): Primary | ICD-10-CM

## 2021-01-08 NOTE — TELEPHONE ENCOUNTER
Pt has new ins, [facility], which has been entered. He needs PA for Aubagio. Once approved, please send RX to [facility]. Informed pt PAs are not done on Fridays due to ins 48 hr response rule. He will CB on Tuesday to verify if it has been started.   Pt has ab

## 2021-01-12 RX ORDER — RENAGEL 800 MG/1
14 TABLET ORAL DAILY
Qty: 30 TABLET | Refills: 5 | Status: SHIPPED | OUTPATIENT
Start: 2021-01-12 | End: 2021-05-18

## 2021-01-12 NOTE — TELEPHONE ENCOUNTER
Prior authorization initiated through cover my meds. Case roa# Cassy Pulido (Roa: [id number]) pending new insurance review and authorization.

## 2021-01-15 NOTE — TELEPHONE ENCOUNTER
Pt has new insurance for 2021 and needs pa for MRI Brain and MRI Spine Cervical.  Pt completing his imaging at [facility] in [facility]. Advised pt will contact him to schedule when pa completed.

## 2021-01-15 NOTE — TELEPHONE ENCOUNTER
Obtained PA for MRI's with patients new insurance. Called patient and he indicated he does not want to have the MRI's until end of may. I explained I will have these withdrawn and start again closer to that date and I will call him back.     Called [facility] and

## 2021-01-22 ENCOUNTER — TELEPHONE (OUTPATIENT)
Dept: NEUROLOGY | Facility: CLINIC | Age: 46
End: 2021-01-22

## 2021-01-22 NOTE — TELEPHONE ENCOUNTER
Pt asked if he should get liver enzymes labs completed due to medication or if current labs fine. Call pt to advise.

## 2021-04-02 ENCOUNTER — TELEPHONE (OUTPATIENT)
Dept: NEUROLOGY | Facility: CLINIC | Age: 46
End: 2021-04-02

## 2021-04-02 NOTE — TELEPHONE ENCOUNTER
Pt calling to report that he is ready to have the MRIs that were ordered in December at [facility]. He is requesting the the PAs be completed now. Verified current insurance information with patient.   Routing to PA team.  Informed patient he would be c

## 2021-04-09 NOTE — TELEPHONE ENCOUNTER
Patient called, requested that we fax MRI order to [address] [place] [address] at [phone]. Fax confirmation received. Patient notified so he can schedule MRI appointment.

## 2021-05-11 ENCOUNTER — TELEPHONE (OUTPATIENT)
Dept: NEUROLOGY | Facility: CLINIC | Age: 46
End: 2021-05-11

## 2021-05-11 NOTE — TELEPHONE ENCOUNTER
Had received [address] MRI reports  MRI Cervical Spine and  MRI Brain   DOS 05/07/21  Report placed at the nurses bin. A copy sent to scan.

## 2021-05-12 NOTE — TELEPHONE ENCOUNTER
Called the patient and told him the MRI reports stable findings in the brain and cervical cord. He can reset his appointment to the end of the year because nothing is really happening at this time.     Maribel Garcia MD  Vascular & General Neurology  [address] [place] [address]

## 2021-05-14 ENCOUNTER — PATIENT MESSAGE (OUTPATIENT)
Dept: NEUROLOGY | Facility: CLINIC | Age: 46
End: 2021-05-14

## 2021-05-17 NOTE — TELEPHONE ENCOUNTER
From: Fabian Dyer  To: Linwood Funk MD  Sent: 5/14/2021 4:28 PM CDT  Subject: Test Results Question    Hi - please add both pages of the MRI radiology report [facility].  Thanks [employer]

## 2021-05-17 NOTE — TELEPHONE ENCOUNTER
It appears that patient has seen results according to misc reports. Answered via [employer] message this information and if there is anything else we can do for patient at this time.

## 2021-05-18 DIAGNOSIS — G35 MS (MULTIPLE SCLEROSIS) (HCC): ICD-10-CM

## 2021-05-18 RX ORDER — RENAGEL 800 MG/1
14 TABLET ORAL DAILY
Qty: 30 TABLET | Refills: 11 | Status: SHIPPED | OUTPATIENT
Start: 2021-05-18 | End: 2021-11-30

## 2021-05-18 NOTE — TELEPHONE ENCOUNTER
Pt had cervical MRI at [facility], but has now received letter from [facility] that coverage is denied due to medical necessity. Pt sent us copy of denial letter.   Advised we will discuss with PA team and clinical staff to appeal.

## 2021-05-18 NOTE — TELEPHONE ENCOUNTER
Medication: Aubagio 14 mg    Date of last refill: 01/21/21 with 5 addt refills  [place]     Last office visit: 12/11/20  Due back to clinic per last office note: RTN in 6 months  Date next office visit scheduled:  12/03/

## 2021-05-18 NOTE — TELEPHONE ENCOUNTER
PA does not guarantee coverage. The insurance probably needs medical records to justify the need for the MRI. Patient should call his insurance to see if he can submit then to the insurance for review.  Or a appeal letter can be written to justify  the need

## 2021-05-19 NOTE — TELEPHONE ENCOUNTER
Relayed PA message to pt. He will contact [facility] to see if medical records will satisfy their review, or if an appeal letter is required. Pt will send us their response via My Chart.

## 2021-08-18 ENCOUNTER — TELEPHONE (OUTPATIENT)
Dept: NEUROLOGY | Facility: CLINIC | Age: 46
End: 2021-08-18

## 2021-08-18 NOTE — TELEPHONE ENCOUNTER
Pt has questions about covid vaccine booster  and advised we are waiting on recommendations from the [facility] but we are recommending pts getting booster. Pt has questions about timing of aubagio and vaccine.     Advisde will call pt when receive [facility] and [facility]

## 2021-10-04 ENCOUNTER — TELEPHONE (OUTPATIENT)
Dept: NEUROLOGY | Facility: CLINIC | Age: 46
End: 2021-10-04

## 2021-11-30 ENCOUNTER — OFFICE VISIT (OUTPATIENT)
Dept: NEUROLOGY | Facility: CLINIC | Age: 46
End: 2021-11-30
Payer: COMMERCIAL

## 2021-11-30 VITALS
BODY MASS INDEX: 21.82 KG/M2 | WEIGHT: 144 LBS | DIASTOLIC BLOOD PRESSURE: 88 MMHG | RESPIRATION RATE: 16 BRPM | HEART RATE: 68 BPM | HEIGHT: 68 IN | SYSTOLIC BLOOD PRESSURE: 122 MMHG

## 2021-11-30 DIAGNOSIS — G35 MS (MULTIPLE SCLEROSIS) (HCC): Primary | ICD-10-CM

## 2021-11-30 PROCEDURE — 99214 OFFICE O/P EST MOD 30 MIN: CPT | Performed by: OTHER

## 2021-11-30 PROCEDURE — 3074F SYST BP LT 130 MM HG: CPT | Performed by: OTHER

## 2021-11-30 PROCEDURE — 3079F DIAST BP 80-89 MM HG: CPT | Performed by: OTHER

## 2021-11-30 PROCEDURE — 3008F BODY MASS INDEX DOCD: CPT | Performed by: OTHER

## 2021-11-30 RX ORDER — RENAGEL 800 MG/1
14 TABLET ORAL DAILY
Qty: 30 TABLET | Refills: 11 | Status: SHIPPED | OUTPATIENT
Start: 2021-11-30

## 2021-11-30 RX ORDER — RENAGEL 800 MG/1
14 TABLET ORAL DAILY
Qty: 30 TABLET | Refills: 11 | Status: SHIPPED | OUTPATIENT
Start: 2021-11-30 | End: 2021-11-30 | Stop reason: CLARIF

## 2021-11-30 RX ORDER — PREDNISONE 20 MG/1
TABLET ORAL
Qty: 30 TABLET | Refills: 0 | Status: SHIPPED | OUTPATIENT
Start: 2021-11-30 | End: 2021-12-06

## 2021-11-30 NOTE — PROGRESS NOTES
[facility] with [address]  5/1/1975  Primary Care Provider:  Juaquin Dickey MD    11/30/2021  Accompanied visit:      (x) No.      51year old yo patient being seen for:  MS    Pr FOR PEAK ALLERGY SEASON, Disp: , Rfl:   •  Clobetasol Propionate 0.05 % External Ointment, Apply to AA on feet BID x 2 weeks, Disp: 45 g, Rfl: 1  •  Econazole Nitrate 1 % External Cream, Apply to AA on feet BID, Disp: 30 g, Rfl: 1  •  Cholecalciferol (PETE Refill:  11            (x) Discussed potential side effects of any treatment relevant to above. Includes explanation of tests as necessary.     1 year    Patient understands that if needed, based on condition and or test results, follow up will be readjust

## 2021-12-01 ENCOUNTER — LAB ENCOUNTER (OUTPATIENT)
Dept: LAB | Facility: HOSPITAL | Age: 46
End: 2021-12-01
Attending: Other
Payer: COMMERCIAL

## 2021-12-01 DIAGNOSIS — G35 MS (MULTIPLE SCLEROSIS) (HCC): ICD-10-CM

## 2021-12-01 PROCEDURE — 85025 COMPLETE CBC W/AUTO DIFF WBC: CPT

## 2021-12-01 PROCEDURE — 36415 COLL VENOUS BLD VENIPUNCTURE: CPT

## 2021-12-01 PROCEDURE — 80053 COMPREHEN METABOLIC PANEL: CPT

## 2021-12-06 PROBLEM — R35.0 URINARY FREQUENCY: Status: ACTIVE | Noted: 2021-12-06

## 2022-06-16 ENCOUNTER — LAB ENCOUNTER (OUTPATIENT)
Dept: LAB | Facility: HOSPITAL | Age: 47
End: 2022-06-16
Attending: INTERNAL MEDICINE
Payer: COMMERCIAL

## 2022-06-16 DIAGNOSIS — E78.5 HYPERLIPIDEMIA, UNSPECIFIED HYPERLIPIDEMIA TYPE: ICD-10-CM

## 2022-06-16 LAB
CHOLEST SERPL-MCNC: 183 MG/DL (ref ?–200)
FASTING PATIENT LIPID ANSWER: YES
HDLC SERPL-MCNC: 50 MG/DL (ref 40–59)
LDLC SERPL CALC-MCNC: 115 MG/DL (ref ?–100)
NONHDLC SERPL-MCNC: 133 MG/DL (ref ?–130)
TRIGL SERPL-MCNC: 96 MG/DL (ref 30–149)
VLDLC SERPL CALC-MCNC: 17 MG/DL (ref 0–30)

## 2022-06-16 PROCEDURE — 80061 LIPID PANEL: CPT

## 2022-06-16 PROCEDURE — 36415 COLL VENOUS BLD VENIPUNCTURE: CPT

## 2022-09-26 DIAGNOSIS — G35 MS (MULTIPLE SCLEROSIS) (HCC): ICD-10-CM

## 2022-09-26 RX ORDER — RENAGEL 800 MG/1
14 TABLET ORAL DAILY
Qty: 30 TABLET | Refills: 3 | Status: SHIPPED
Start: 2022-09-26

## 2022-09-26 NOTE — TELEPHONE ENCOUNTER
Aubagio refill 14 mg    Pt stated his MS 1:1 needs new prescription with 4 refills for the rest of the year.     Fax to:  [phone]    Call pt to advise when aubagio rx is faxed to MS 1:1.

## 2022-09-26 NOTE — TELEPHONE ENCOUNTER
RN spoke to patient informing RN he ran out of his PAP money. Patient is currently enrolled in the exception program and needs the prescription faxed to MS 1 to 1.

## 2023-02-16 DIAGNOSIS — G35 MS (MULTIPLE SCLEROSIS) (HCC): ICD-10-CM

## 2023-02-16 RX ORDER — RENAGEL 800 MG/1
14 TABLET ORAL DAILY
Qty: 30 TABLET | Refills: 5 | Status: SHIPPED | OUTPATIENT
Start: 2023-02-16

## 2023-02-16 NOTE — TELEPHONE ENCOUNTER
Patient calling, advised he needs a refill on his Aubagio. Please advise and send to: [facility] [address] [phone], [phone]    Patient would like a call when medication has been ordered. Can leave VM. Please advise.

## 2023-03-13 ENCOUNTER — TELEMEDICINE (OUTPATIENT)
Dept: NEUROLOGY | Facility: CLINIC | Age: 48
End: 2023-03-13
Payer: COMMERCIAL

## 2023-03-13 ENCOUNTER — TELEPHONE (OUTPATIENT)
Dept: NEUROLOGY | Facility: CLINIC | Age: 48
End: 2023-03-13

## 2023-03-13 DIAGNOSIS — G35 MS (MULTIPLE SCLEROSIS) (HCC): ICD-10-CM

## 2023-03-13 DIAGNOSIS — G35 MULTIPLE SCLEROSIS (HCC): Primary | ICD-10-CM

## 2023-03-13 RX ORDER — RENAGEL 800 MG/1
14 TABLET ORAL DAILY
Qty: 30 TABLET | Refills: 11 | Status: SHIPPED | OUTPATIENT
Start: 2023-03-13

## 2023-03-13 NOTE — TELEPHONE ENCOUNTER
Pt stated he will complete imaging in May of 2023 at [address] [place] [address] in [facility] on [address]. Pt stated he completed last imaging at this location. Advised the pa team will call pt when pa completed so he can schedule appt.

## 2023-04-13 ENCOUNTER — TELEPHONE (OUTPATIENT)
Dept: NEUROLOGY | Facility: CLINIC | Age: 48
End: 2023-04-13

## 2023-04-13 DIAGNOSIS — G35 MULTIPLE SCLEROSIS (HCC): Primary | ICD-10-CM

## 2023-04-13 RX ORDER — PREDNISONE 20 MG/1
TABLET ORAL
Qty: 30 TABLET | Refills: 0 | Status: SHIPPED | OUTPATIENT
Start: 2023-04-13

## 2023-04-13 RX ORDER — PREDNISONE 20 MG/1
TABLET ORAL
Qty: 30 TABLET | Refills: 0 | Status: SHIPPED | OUTPATIENT
Start: 2023-04-13 | End: 2023-04-13

## 2023-04-13 NOTE — TELEPHONE ENCOUNTER
Called and informed patient that oral steroid taper has been sent to pharmacy. [facility]. Administration instructions given. Instructed to call next week if not feeling better. Voiced understanding.

## 2023-04-13 NOTE — TELEPHONE ENCOUNTER
S: Calling with possible symptoms of MS relapse, starting end of March  B: MS patient, currently using Aubagio for DMT. Last seen 3/13/23 with notes:     Liver function tests and CBC were normal this February 2023. Last set of MRI was May 2021. Advised that we should be doing follow-up MRI studies    A: Reporting the following new or worsening symptoms (describe):  [x] numbness/tingling - in the face, very minimal, also notes pressure on top of his head  [] muscle weakness  [] balance issues  [] falls  [x] increased fatigue  [] vision changes  [x] cognitive complaints     [] Any sign/symptom of infection: denies   He endorsed increased stress. R: Bia Wise reports that he used an old MDP which helped somewhat, but not completely resolving symptoms. He would be interested in an oral taper, if this would be an appropriate option.

## 2023-06-23 ENCOUNTER — TELEPHONE (OUTPATIENT)
Dept: NEUROLOGY | Facility: CLINIC | Age: 48
End: 2023-06-23

## 2023-06-23 DIAGNOSIS — G35 MS (MULTIPLE SCLEROSIS) (HCC): Primary | ICD-10-CM

## 2023-06-26 ENCOUNTER — OFFICE VISIT (OUTPATIENT)
Dept: HEMATOLOGY/ONCOLOGY | Facility: HOSPITAL | Age: 48
End: 2023-06-26
Attending: Other
Payer: COMMERCIAL

## 2023-06-26 VITALS
OXYGEN SATURATION: 98 % | SYSTOLIC BLOOD PRESSURE: 175 MMHG | TEMPERATURE: 98 F | DIASTOLIC BLOOD PRESSURE: 82 MMHG | BODY MASS INDEX: 22 KG/M2 | RESPIRATION RATE: 20 BRPM | WEIGHT: 140.19 LBS | HEART RATE: 70 BPM

## 2023-06-26 DIAGNOSIS — G35 MULTIPLE SCLEROSIS (HCC): Primary | ICD-10-CM

## 2023-06-26 PROCEDURE — 96365 THER/PROPH/DIAG IV INF INIT: CPT

## 2023-06-26 NOTE — PROGRESS NOTES
Education Record    Learner:  Patient    Disease / Diagnosis: MS    Barriers / Limitations:  None    Method:  Brief focused, printed material and  reinforcement    General Topics:  Plan of care reviewed    Outcome:  Shows understanding    Solumedrol steroid burst. Has remaining appts

## 2023-06-27 ENCOUNTER — OFFICE VISIT (OUTPATIENT)
Dept: HEMATOLOGY/ONCOLOGY | Facility: HOSPITAL | Age: 48
End: 2023-06-27
Attending: Other
Payer: COMMERCIAL

## 2023-06-27 VITALS
SYSTOLIC BLOOD PRESSURE: 136 MMHG | RESPIRATION RATE: 18 BRPM | WEIGHT: 141.19 LBS | DIASTOLIC BLOOD PRESSURE: 78 MMHG | BODY MASS INDEX: 22 KG/M2 | TEMPERATURE: 98 F | OXYGEN SATURATION: 98 % | HEART RATE: 85 BPM

## 2023-06-27 DIAGNOSIS — G35 MULTIPLE SCLEROSIS (HCC): Primary | ICD-10-CM

## 2023-06-27 PROCEDURE — 96365 THER/PROPH/DIAG IV INF INIT: CPT

## 2023-06-28 ENCOUNTER — OFFICE VISIT (OUTPATIENT)
Dept: HEMATOLOGY/ONCOLOGY | Facility: HOSPITAL | Age: 48
End: 2023-06-28
Attending: Other
Payer: COMMERCIAL

## 2023-06-28 VITALS
BODY MASS INDEX: 22 KG/M2 | OXYGEN SATURATION: 99 % | SYSTOLIC BLOOD PRESSURE: 139 MMHG | TEMPERATURE: 98 F | HEART RATE: 67 BPM | DIASTOLIC BLOOD PRESSURE: 83 MMHG | RESPIRATION RATE: 18 BRPM | WEIGHT: 142.38 LBS

## 2023-06-28 DIAGNOSIS — G35 MULTIPLE SCLEROSIS (HCC): Primary | ICD-10-CM

## 2023-06-28 PROCEDURE — 96365 THER/PROPH/DIAG IV INF INIT: CPT

## 2023-08-04 ENCOUNTER — LAB ENCOUNTER (OUTPATIENT)
Dept: LAB | Facility: HOSPITAL | Age: 48
End: 2023-08-04
Attending: INTERNAL MEDICINE
Payer: COMMERCIAL

## 2023-08-04 DIAGNOSIS — E78.5 HYPERLIPEMIA: Primary | ICD-10-CM

## 2023-08-04 LAB
CHOLEST SERPL-MCNC: 224 MG/DL (ref ?–200)
FASTING PATIENT LIPID ANSWER: YES
HDLC SERPL-MCNC: 54 MG/DL (ref 40–59)
LDLC SERPL CALC-MCNC: 150 MG/DL (ref ?–100)
NONHDLC SERPL-MCNC: 170 MG/DL (ref ?–130)
TRIGL SERPL-MCNC: 112 MG/DL (ref 30–149)
VLDLC SERPL CALC-MCNC: 21 MG/DL (ref 0–30)

## 2023-08-04 PROCEDURE — 80061 LIPID PANEL: CPT

## 2023-08-04 PROCEDURE — 36415 COLL VENOUS BLD VENIPUNCTURE: CPT

## 2024-01-21 DIAGNOSIS — G35 MS (MULTIPLE SCLEROSIS) (HCC): ICD-10-CM

## 2024-01-22 RX ORDER — TERIFLUNOMIDE 14 MG/1
1 TABLET, FILM COATED ORAL DAILY
Qty: 30 TABLET | Refills: 2 | Status: SHIPPED | OUTPATIENT
Start: 2024-01-22

## 2024-01-22 NOTE — TELEPHONE ENCOUNTER
Medication: Teriflunomide (AUBAGIO) 14 MG      Date of last refill: 3/13/23 (#30/11R)  [place]     Last office visit: 3/13/23 Telemedicine  Due back to clinic per last office note:  no mention  Date next office visit scheduled:    No future appointments.        Last OV note recommendation:    Assessment and PLAN (Recommendation/s):  Problems:  Multiple sclerosis (HCC)  (primary encounter diagnosis)  MS (multiple sclerosis) (HCC)     Continue Aubagio  Liver function tests and CBC were normal this February 2023.  Last set of MRI was May 2021.  Advised that we should be doing follow-up MRI studies  Orders Placed This Encounter      MRI BRAIN (W+WO) (CPT=70553) [0020242]      MRI SPINE CERVICAL (W+WO) (CPT=72156) [[id number]]

## 2024-04-03 ENCOUNTER — TELEPHONE (OUTPATIENT)
Dept: NEUROLOGY | Facility: CLINIC | Age: 49
End: 2024-04-03

## 2024-04-03 DIAGNOSIS — G35 MS (MULTIPLE SCLEROSIS) (HCC): ICD-10-CM

## 2024-04-03 RX ORDER — TERIFLUNOMIDE 14 MG/1
1 TABLET, FILM COATED ORAL DAILY
Qty: 30 TABLET | Refills: 3 | Status: SHIPPED | OUTPATIENT
Start: 2024-04-03 | End: 2024-04-04

## 2024-04-03 NOTE — TELEPHONE ENCOUNTER
Patient called request prescription change of Teriflunomide to the generic version from [employer]. They are the  that offers a copay assistance for patient.    Also given information on the cost plus savings program. Patient thankful for the information.    Prescription changed for patient.

## 2024-04-03 NOTE — TELEPHONE ENCOUNTER
Patient calling, needs refill on Teriflunomide 14 MG Oral Tab.    Per [facility], wants medication to be sent under the  [employer], trying to get copay assistance for medication.     Please advise

## 2024-04-04 RX ORDER — TERIFLUNOMIDE 14 MG/1
1 TABLET, FILM COATED ORAL DAILY
Qty: 90 TABLET | Refills: 3 | Status: SHIPPED | OUTPATIENT
Start: 2024-04-04

## 2024-04-04 RX ORDER — TERIFLUNOMIDE 14 MG/1
1 TABLET, FILM COATED ORAL DAILY
Qty: 30 TABLET | Refills: 11 | Status: SHIPPED | OUTPATIENT
Start: 2024-04-04 | End: 2024-04-04

## 2024-04-04 NOTE — TELEPHONE ENCOUNTER
Pt stated [facility] does not have rx.  Pt stated it must be [employer].    [facility]:  [phone]    Call pt to discuss and advise with other questions related to this rx.

## 2024-04-04 NOTE — TELEPHONE ENCOUNTER
Patient calling, advised please do not send to [facility]. Notes that [facility] cost with copay assistance is still over 1k.     Instead patient would like Rx to be sent through [facility]. Pharmacy code: NCPDPID#[id number].    Please advise sending to [facility] pharmacy as soon as possible as patient is running low on medication.

## 2024-06-17 ENCOUNTER — OFFICE VISIT (OUTPATIENT)
Dept: NEUROLOGY | Facility: CLINIC | Age: 49
End: 2024-06-17
Payer: COMMERCIAL

## 2024-06-17 VITALS
DIASTOLIC BLOOD PRESSURE: 84 MMHG | RESPIRATION RATE: 16 BRPM | WEIGHT: 142 LBS | HEART RATE: 66 BPM | OXYGEN SATURATION: 99 % | BODY MASS INDEX: 21.52 KG/M2 | SYSTOLIC BLOOD PRESSURE: 130 MMHG | HEIGHT: 68 IN

## 2024-06-17 DIAGNOSIS — G35 MS (MULTIPLE SCLEROSIS) (HCC): ICD-10-CM

## 2024-06-17 PROCEDURE — 3008F BODY MASS INDEX DOCD: CPT | Performed by: OTHER

## 2024-06-17 PROCEDURE — 3079F DIAST BP 80-89 MM HG: CPT | Performed by: OTHER

## 2024-06-17 PROCEDURE — 99214 OFFICE O/P EST MOD 30 MIN: CPT | Performed by: OTHER

## 2024-06-17 PROCEDURE — 3075F SYST BP GE 130 - 139MM HG: CPT | Performed by: OTHER

## 2024-06-17 RX ORDER — TERIFLUNOMIDE 14 MG/1
1 TABLET, FILM COATED ORAL DAILY
Qty: 90 TABLET | Refills: 3 | Status: SHIPPED | OUTPATIENT
Start: 2024-06-17

## 2024-06-17 RX ORDER — TERIFLUNOMIDE 14 MG/1
1 TABLET, FILM COATED ORAL DAILY
Qty: 90 TABLET | Refills: 3 | Status: SHIPPED | OUTPATIENT
Start: 2024-06-17 | End: 2024-06-17

## 2024-06-17 NOTE — PROGRESS NOTES
NEUROLOGY  [facility]       Lico Doty  5/1/1975  Primary Care Provider:  Fernando Biswas MD    6/17/2024  49 year old yo,  was last seen on:: March 2023 Telemedicine    Seen for/plans last visit:  MS on Teriflunomide    Previous visit and existing record notes reviewed in preparation for the face to face visit.  Relevant labs and studies reviewed and will be noted in relevant areas of this record.  Accompanied visit:     (x) No.      Present condition:  He lost his job 8 months ago and this is adding to his stress and has found himself struggling through his fatigue and tiredness    Otherwise no MS relapsing symptoms to report\  Taking Teriflunomide without problem, except the ability  to get his supplies      Past History update/new problem(s): as above    Review of Systems:  Review of Systems:  Denies systemic symptoms     No CP or SOB.  No GI or  symptoms. Relevant Neuro as noted above.      Medications:      Current Outpatient Medications:     Teriflunomide 14 MG Oral Tab, Take 1 tablet by mouth daily., Disp: 90 tablet, Rfl: 3    predniSONE 20 MG Oral Tab, 3 tab daily for 5 days, then 2 tab daily for 5 days then 1 tab daily for 5 days then stop, Disp: 30 tablet, Rfl: 0    Sildenafil Citrate 100 MG Oral Tab, Take 1 tablet (100 mg total) by mouth daily as needed for Erectile Dysfunction., Disp: 30 tablet, Rfl: 6    Mometasone Furo-Formoterol Fum (DULERA) 200-5 MCG/ACT Inhalation Aerosol, Inhale 2 puffs into the lungs 2 (two) times daily., Disp: 3 each, Rfl: 3    Urea 40 % External Cream, Apply to bilateral feet nightly., Disp: 80 g, Rfl: 3    Betamethasone Dipropionate 0.05 % External Ointment, Apply to AA on the fissures of the bilateral feet BID for 14 days, Disp: 50 g, Rfl: 2    Urea 20 % External Cream, Apply bid to feet., Disp: 80 g, Rfl: 3    UTOPIC 41 % External Cream, Apply 1 Application topically 2 (two) times daily. To feet., Disp: 227 g, Rfl: 5    albuterol 108 (90 Base) MCG/ACT  Inhalation Aero Soln, Inhale 2 puffs into the lungs., Disp: , Rfl:     B Complex Oral Tab, None Entered, Disp: , Rfl:     Fluticasone Propionate 50 MCG/ACT Nasal Suspension, PRN FOR PEAK ALLERGY SEASON, Disp: , Rfl:     Clobetasol Propionate 0.05 % External Ointment, Apply to AA on feet BID x 2 weeks, Disp: 45 g, Rfl: 1    Econazole Nitrate 1 % External Cream, Apply to AA on feet BID, Disp: 30 g, Rfl: 1    Cholecalciferol (VITAMIN D) 2000 UNITS Oral Tab, Take 1 tablet by mouth daily., Disp: , Rfl:     Vitamin B-12 (VITAMIN B12) 1000 MCG Oral Tab, Take 2 tablets (2,000 mcg total) by mouth daily., Disp: , Rfl:     Multiple Vitamin (MULTI-VITAMIN OR), Take 1 tablet by mouth daily., Disp: , Rfl:   PRN:     Allergies:  No Known Allergies       EXAM:  /84   Pulse 66   Resp 16   Ht 68\"   Wt 142 lb (64.4 kg)   SpO2 99%   BMI 21.59 kg/m²   Looks stated age  General Exam:  HENT:  pink conjunctiva anicteric sclerae  Neck no adenopathy, thyroid normal  Heart and Lungs:  normal  Extremities: no cyanosis, skin changes    NEURO  NEURO  Able to relate events with fluent speech and intact comprehension  CN 2-12: pupils reactive, VF full face symmetric sensation and movement tongue midline  No motor focal findings  Sensory: no lateralizing findings  Reflexes are symmetric  UMN signs: none  Gait: narrow based          INTERPRETATION of RELEVANT LABS and other DATA:          Problem/s Identified this visit:   1. MS (multiple sclerosis) ([facility])          Discussion plus Diagnostics & Treatment Orders:  Orders Placed This Encounter    DISCONTD: Teriflunomide 14 MG Oral Tab     Sig: Take 1 tablet by mouth daily.     Dispense:  90 tablet     Refill:  3     [email or web address]    Teriflunomide 14 MG Oral Tab     Sig: Take 1 tablet by mouth daily.     Dispense:  90 tablet     Refill:  3     [email or web address]      CPM        (x) Discussed potential side effects of any treatment relevant to above.  Includes explanation of tests  as necessary.    Return in about 6 months (around 12/17/2024).      Patient understands that if needed, based on condition and or test results, follow up will be readjusted      Austen Willard MD  Vascular & General Neurology  Director, Multiple Sclerosis Program  [facility]  6/17/2024, Time completed 9:29 AM    Decision making:  ( x ) labs reviewed/ordered - 1  (  ) new diagnosis: - 1  ( x) Images & studies independently reviewed -non F2F  (  ) Case/studies discussed with other caregivers - -non F2F  (  ) Telephone time with patiern or authorized Fam member--non F2F  ( x ) other records reviewed --non F2F including consultations  (  ) Fam meetings - patient not present --non F2F  (  ) Independent Historian obtained    Non Face to Face CPT code 15812/95579 applies as documented above    PROCEDURE DONE     (   ) see notes        After visit, patient was escorted out and handed-off discharge process and instructions to the check out desk.  No additional issues relevant to visit were raised to staff at this time interval.        This document is to be interpreted as my current opinion regarding the case as of the stated date of service based on the information available to me at this time and may supersedes any prior opinion expressed either orally or in writing.  Services rendered are only within the scope of direct medical care  Sometimes, reports may have been prepared partially using a speech recognition software technology.  If a word or phrase is confusing or out of context, please do not hesitate to call for clarification.

## 2024-06-17 NOTE — PATIENT INSTRUCTIONS
Refill policies:    Allow 2-3 business days for refills; controlled substances may take longer.  Contact your pharmacy at least 5 days prior to running out of medication and have them send an electronic request or submit request through the “request refill” option in your [employer] account.  Refills are not addressed on weekends; covering physicians do not authorize routine medications on weekends.  No narcotics or controlled substances are refilled after noon on Fridays or by on call physicians.  By law, narcotics must be electronically prescribed.  A 30 day supply with no refills is the maximum allowed.  If your prescription is due for a refill, you may be due for a follow up appointment.  To best provide you care, patients receiving routine medications need to be seen at least once a year.  Patients receiving narcotic/controlled substance medications need to be seen at least once every 3 months.  In the event that your preferred pharmacy does not have the requested medication in stock (e.g. Backordered), it is your responsibility to find another pharmacy that has the requested medication available.  We will gladly send a new prescription to that pharmacy at your request.    Scheduling Tests:    If your physician has ordered radiology tests such as MRI or CT scans, please contact Central Scheduling at [phone] right away to schedule the test.  Once scheduled, the [facility] Centralized Referral Team will work with your insurance carrier to obtain pre-certification or prior authorization.  Depending on your insurance carrier, approval may take 3-10 days.  It is highly recommended patients assure they have received an authorization before having a test performed.  If test is done without insurance authorization, patient may be responsible for the entire amount billed.      Precertification and Prior Authorizations:  If your physician has recommended that you have a procedure or additional testing performed the [facility]  Centralized Referral Team will contact your insurance carrier to obtain pre-certification or prior authorization.    You are strongly encouraged to contact your insurance carrier to verify that your procedure/test has been approved and is a COVERED benefit.  Although the [facility] Centralized Referral Team does its due diligence, the insurance carrier gives the disclaimer that \"Although the procedure is authorized, this does not guarantee payment.\"    Ultimately the patient is responsible for payment.   Thank you for your understanding in this matter.  Paperwork Completion:  If you require FMLA or disability paperwork for your recovery, please make sure to either drop it off or have it faxed to our office at [phone]. Be sure the form has your name and date of birth on it.  The form will be faxed to our Forms Department and they will complete it for you.  There is a 25$ fee for all forms that need to be filled out.  Please be aware there is a 10-14 day turnaround time.  You will need to sign a release of information (REBECCA) form if your paperwork does not come with one.  You may call the Forms Department with any questions at [phone].  Their fax number is [phone].

## 2025-03-03 ENCOUNTER — TELEPHONE (OUTPATIENT)
Dept: NEUROLOGY | Facility: CLINIC | Age: 50
End: 2025-03-03

## 2025-03-03 DIAGNOSIS — G35 MS (MULTIPLE SCLEROSIS) (HCC): Primary | ICD-10-CM

## 2025-03-03 RX ORDER — PREDNISONE 20 MG/1
TABLET ORAL
Qty: 30 TABLET | Refills: 0 | OUTPATIENT
Start: 2025-03-03 | End: 2025-03-18

## 2025-03-03 NOTE — TELEPHONE ENCOUNTER
Patient having MS flare.  Infuse Solumedrol 500 mg IVPB once daily for 3 days at the [facility]. Patient to follow with steroid taper as ordered  CPT code 14706:  Infusion  J Code:

## 2025-03-03 NOTE — TELEPHONE ENCOUNTER
Per Dr Willard, ordered PREDNISONIE oral taper.  Patient informed.  Patient would now like to infuse SOLUMEDROL followed by oral taper due to having added stress with a new job coming up.  See alternate TE for orders.

## 2025-03-03 NOTE — TELEPHONE ENCOUNTER
S: Calling with possible symptoms of MS relapse, starting 7 days ago  B: MS patient, currently using TERIFLUNOMIDE for DMT.  Last seen 6/17/2024 with notes: Discussion plus Diagnostics & Treatment Orders:       Orders Placed This Encounter    DISCONTD: Teriflunomide 14 MG Oral Tab       Sig: Take 1 tablet by mouth daily.       Dispense:  90 tablet       Refill:  3       [email or web address]    Teriflunomide 14 MG Oral Tab       Sig: Take 1 tablet by mouth daily.       Dispense:  90 tablet       Refill:  3       [email or web address]      CPM           (x) Discussed potential side effects of any treatment relevant to above.  Includes explanation of tests as necessary.     Return in about 6 months (around 12/17/2024).        Patient understands that if needed, based on condition and or test results, follow up will be readjusted        Austen Willard MD  Vascular & General Neurology  Director, Multiple Sclerosis Program  [facility]  6/17/2024, Time completed 9:29 AM       A: Reporting the following new or worsening symptoms (describe):  [x] numbness/tingling of face  [] muscle weakness  [] balance issues  [] falls  [] increased fatigue  [] vision changes  [x] cognitive complaints brain fog      [x] Any sign/symptom of infection: no   Last treated for flare-ups on 6/2023 with IV steroids with oral taper with response:effective  R: Will alert provider and request recommendations.

## 2025-03-03 NOTE — TELEPHONE ENCOUNTER
Patient calling he is having flare up and is requesting prednazone         [id number] [place] [address] [address] AT [facility] [address], [phone], [phone]

## 2025-03-04 ENCOUNTER — NURSE ONLY (OUTPATIENT)
Dept: NEUROLOGY | Facility: CLINIC | Age: 50
End: 2025-03-04
Payer: COMMERCIAL

## 2025-03-04 VITALS — DIASTOLIC BLOOD PRESSURE: 64 MMHG | RESPIRATION RATE: 16 BRPM | HEART RATE: 74 BPM | SYSTOLIC BLOOD PRESSURE: 130 MMHG

## 2025-03-04 DIAGNOSIS — G35 MS (MULTIPLE SCLEROSIS) (HCC): Primary | ICD-10-CM

## 2025-03-04 PROCEDURE — 3075F SYST BP GE 130 - 139MM HG: CPT | Performed by: OTHER

## 2025-03-04 PROCEDURE — 3078F DIAST BP <80 MM HG: CPT | Performed by: OTHER

## 2025-03-04 PROCEDURE — 96365 THER/PROPH/DIAG IV INF INIT: CPT | Performed by: OTHER

## 2025-03-04 RX ORDER — METHYLPREDNISOLONE SODIUM SUCCINATE 500 MG/8ML
500 INJECTION INTRAMUSCULAR; INTRAVENOUS ONCE
Status: COMPLETED | OUTPATIENT
Start: 2025-03-04 | End: 2025-03-04

## 2025-03-04 RX ADMIN — METHYLPREDNISOLONE SODIUM SUCCINATE 500 MG: 500 INJECTION INTRAMUSCULAR; INTRAVENOUS at 14:31:00

## 2025-03-04 NOTE — TELEPHONE ENCOUNTER
Prior authorization request completed for:  solumedrol   Authorization #no auth needed   Pre-D: no  CPT codes approved: ,70099  Physician: sharif  Location: office  Call Ref#: [id number]  Representative Name: meagan  Insurance Carrier: bc [phone]

## 2025-03-04 NOTE — TELEPHONE ENCOUNTER
Spoke to patient and informed him of the approval of Solumedrol.  He states that he will come to our office today for the first infusion however if he can't make it, he will call us and reschedule for tomorrow.

## 2025-03-04 NOTE — PROGRESS NOTES
IV started per Pili MANTILLA and Solu-Medrol infused over 20 minutes without difficulty. IV discontinued per RN. Patient tolerated infusion well with no complications.    Patient has brain fog and numbness/tingling of face.

## 2025-03-05 ENCOUNTER — NURSE ONLY (OUTPATIENT)
Dept: NEUROLOGY | Facility: CLINIC | Age: 50
End: 2025-03-05
Payer: COMMERCIAL

## 2025-03-05 VITALS — DIASTOLIC BLOOD PRESSURE: 88 MMHG | RESPIRATION RATE: 16 BRPM | SYSTOLIC BLOOD PRESSURE: 128 MMHG

## 2025-03-05 DIAGNOSIS — G35 MS (MULTIPLE SCLEROSIS) (HCC): Primary | ICD-10-CM

## 2025-03-05 RX ORDER — METHYLPREDNISOLONE SODIUM SUCCINATE 500 MG/8ML
500 INJECTION INTRAMUSCULAR; INTRAVENOUS DAILY
Status: COMPLETED | OUTPATIENT
Start: 2025-03-05 | End: 2025-03-06

## 2025-03-05 RX ORDER — PREDNISONE 20 MG/1
TABLET ORAL
Qty: 30 TABLET | Refills: 0 | Status: SHIPPED | OUTPATIENT
Start: 2025-03-07 | End: 2025-03-22

## 2025-03-05 RX ADMIN — METHYLPREDNISOLONE SODIUM SUCCINATE 500 MG: 500 INJECTION INTRAMUSCULAR; INTRAVENOUS at 09:49:00

## 2025-03-05 NOTE — PROGRESS NOTES
IV started per Pili MANTILLA and Solu-Medrol infused over 20 minutes without difficulty. IV discontinued per RN. Patient tolerated infusion well with no complications.    Patient states face tingling is gone, is feeling better.  Did wake up early this morning.

## 2025-03-06 ENCOUNTER — NURSE ONLY (OUTPATIENT)
Dept: NEUROLOGY | Facility: CLINIC | Age: 50
End: 2025-03-06
Payer: COMMERCIAL

## 2025-03-06 VITALS — SYSTOLIC BLOOD PRESSURE: 134 MMHG | DIASTOLIC BLOOD PRESSURE: 68 MMHG

## 2025-03-06 DIAGNOSIS — G35 MULTIPLE SCLEROSIS (HCC): Primary | ICD-10-CM

## 2025-03-06 PROCEDURE — 96365 THER/PROPH/DIAG IV INF INIT: CPT | Performed by: OTHER

## 2025-03-06 RX ADMIN — METHYLPREDNISOLONE SODIUM SUCCINATE 500 MG: 500 INJECTION INTRAMUSCULAR; INTRAVENOUS at 09:36:00

## 2025-03-06 NOTE — PROGRESS NOTES
IV started per Dodie MANTILLA into left AC and Solu-Medrol infused over 20 minutes without difficulty. IV discontinued per RN. Patient tolerated infusion well with no complications.

## 2025-06-10 ENCOUNTER — OFFICE VISIT (OUTPATIENT)
Dept: NEUROLOGY | Facility: CLINIC | Age: 50
End: 2025-06-10
Payer: COMMERCIAL

## 2025-06-10 VITALS — DIASTOLIC BLOOD PRESSURE: 82 MMHG | HEART RATE: 100 BPM | SYSTOLIC BLOOD PRESSURE: 118 MMHG | RESPIRATION RATE: 16 BRPM

## 2025-06-10 DIAGNOSIS — G35 MULTIPLE SCLEROSIS (HCC): Primary | ICD-10-CM

## 2025-06-10 DIAGNOSIS — G35 MS (MULTIPLE SCLEROSIS) (HCC): ICD-10-CM

## 2025-06-10 PROCEDURE — 3074F SYST BP LT 130 MM HG: CPT | Performed by: OTHER

## 2025-06-10 PROCEDURE — 3079F DIAST BP 80-89 MM HG: CPT | Performed by: OTHER

## 2025-06-10 PROCEDURE — 99214 OFFICE O/P EST MOD 30 MIN: CPT | Performed by: OTHER

## 2025-06-10 RX ORDER — TERIFLUNOMIDE 14 MG/1
1 TABLET, FILM COATED ORAL DAILY
Qty: 90 TABLET | Refills: 3 | Status: SHIPPED | OUTPATIENT
Start: 2025-06-10

## 2025-06-10 NOTE — PATIENT INSTRUCTIONS
Refill policies:    Allow 2-3 business days for refills; controlled substances may take longer.  Contact your pharmacy at least 5 days prior to running out of medication and have them send an electronic request or submit request through the “request refill” option in your [employer] account.  Refills are not addressed on weekends; covering physicians do not authorize routine medications on weekends.  No narcotics or controlled substances are refilled after noon on Fridays or by on call physicians.  By law, narcotics must be electronically prescribed.  A 30 day supply with no refills is the maximum allowed.  If your prescription is due for a refill, you may be due for a follow up appointment.  To best provide you care, patients receiving routine medications need to be seen at least once a year.  Patients receiving narcotic/controlled substance medications need to be seen at least once every 3 months.  In the event that your preferred pharmacy does not have the requested medication in stock (e.g. Backordered), it is your responsibility to find another pharmacy that has the requested medication available.  We will gladly send a new prescription to that pharmacy at your request.    Scheduling Tests:    If your physician has ordered radiology tests such as MRI or CT scans, please contact Central Scheduling at [phone] right away to schedule the test.  Once scheduled, the [facility] Centralized Referral Team will work with your insurance carrier to obtain pre-certification or prior authorization.  Depending on your insurance carrier, approval may take 3-10 days.  It is highly recommended patients assure they have received an authorization before having a test performed.  If test is done without insurance authorization, patient may be responsible for the entire amount billed.      Precertification and Prior Authorizations:  If your physician has recommended that you have a procedure or additional testing performed the [facility]  Centralized Referral Team will contact your insurance carrier to obtain pre-certification or prior authorization.    You are strongly encouraged to contact your insurance carrier to verify that your procedure/test has been approved and is a COVERED benefit.  Although the [facility] Centralized Referral Team does its due diligence, the insurance carrier gives the disclaimer that \"Although the procedure is authorized, this does not guarantee payment.\"    Ultimately the patient is responsible for payment.   Thank you for your understanding in this matter.  Paperwork Completion:  If you require FMLA or disability paperwork for your recovery, please make sure to either drop it off or have it faxed to our office at [phone]. Be sure the form has your name and date of birth on it.  The form will be faxed to our Forms Department and they will complete it for you.  There is a 25$ fee for all forms that need to be filled out.  Please be aware there is a 10-14 day turnaround time.  You will need to sign a release of information (REBECCA) form if your paperwork does not come with one.  You may call the Forms Department with any questions at [phone].  Their fax number is [phone].

## 2025-06-10 NOTE — PROGRESS NOTES
NEUROLOGY  [facility]       Lico Doty  5/1/1975  Primary Care Provider:  Fernando Biswas MD    6/10/2025  50 year old yo,     Assessment & plans last visit:  MS    Previous visit and existing record notes reviewed in preparation for the face to face visit.  Relevant labs and studies reviewed and will be noted in relevant areas of this record.  Accompanied visit:     (x) No.      Present condition:  He had brain fog and responded to steroid and responded beuatifully  Occasional itching    Otherwise has done well the past y ear      Past History Reviewed & update/new problem(s): no new    Review of Systems:  Review of Systems:  Denies systemic symptoms     No CP or SOB.  No GI or  symptoms. Relevant Neuro as noted above.      Medications:    Medications - Current[1]  PRN: PRN Medications[2]    Allergies:  Allergies[3]       EXAM:  /82 (BP Location: Right arm, Patient Position: Sitting, Cuff Size: adult)   Pulse 100   Resp 16   Looks stated age  General Exam:  HENT:  pink conjunctiva anicteric sclerae  Neck no adenopathy, thyroid normal  Heart and Lungs:  normal  Extremities: no cyanosis, skin changes    NEURO  NEURO  Able to relate events with fluent speech and intact comprehension  CN 2-12: pupils reactive, VF full face symmetric sensation and movement tongue midline  No motor focal findings  Sensory: no lateralizing findings  Reflexes are symmetric  UMN signs: none  Gait: narrow based          INTERPRETATION of RELEVANT LABS and other DATA:          Problem/s Identified this visit:   1. Multiple sclerosis (HCC)    2. MS (multiple sclerosis) (HCC)          Discussion plus Diagnostics & Treatment Orders:  Clnically stable on Teriflunomide  Monitor itching, may need steroids too    Discussed current medication options      (x) Discussed potential side effects of any treatment relevant to above.  Includes explanation of tests as necessary.    Return in about 1 year (around  6/10/2026).      Patient understands that if needed, based on condition and or test results, follow up will be readjusted      Austen Willard MD  Vascular & General Neurology  Director, Multiple Sclerosis Program  [facility]  6/10/2025, Time completed 10:07 AM    Decision making:  ( x ) labs reviewed/ordered - 1  (  ) new diagnosis: - 1  ( x) Images & studies independently reviewed -non F2F  (  ) Case/studies discussed with other caregivers - -non F2F  (  ) Telephone time with patiern or authorized [facility] member--non F2F  ( x ) other records reviewed --non F2F including consultations  (  ) [facility] meetings - patient not present --non F2F  (  ) Independent Historian obtained    Non Face to Face CPT code 89031/72425 applies as documented above    PROCEDURE DONE     (   ) see notes        After visit, patient was escorted out and handed-off discharge process and instructions to the check out desk.  No additional issues relevant to visit were raised to staff at this time interval.        This document is to be interpreted as my current opinion regarding the case as of the stated date of service based on the information available to me at this time and may supersedes any prior opinion expressed either orally or in writing.  Services rendered are only within the scope of direct medical care  Sometimes, reports may have been prepared partially using a speech recognition software technology.  If a word or phrase is confusing or out of context, please do not hesitate to call for clarification.              [1]   Current Outpatient Medications:     Teriflunomide 14 MG Oral Tab, Take 1 tablet by mouth daily., Disp: 90 tablet, Rfl: 3    Sildenafil Citrate 100 MG Oral Tab, Take 1 tablet (100 mg total) by mouth daily as needed for Erectile Dysfunction., Disp: 30 tablet, Rfl: 6    Mometasone Furo-Formoterol Fum (DULERA) 200-5 MCG/ACT Inhalation Aerosol, Inhale 2 puffs into the lungs 2 (two) times daily., Disp: 3  each, Rfl: 3    albuterol 108 (90 Base) MCG/ACT Inhalation Aero Soln, Inhale 2 puffs into the lungs., Disp: , Rfl:     B Complex Oral Tab, None Entered, Disp: , Rfl:     Fluticasone Propionate 50 MCG/ACT Nasal Suspension, PRN FOR PEAK ALLERGY SEASON, Disp: , Rfl:     Cholecalciferol (VITAMIN D) 2000 UNITS Oral Tab, Take 1 tablet by mouth daily., Disp: , Rfl:     Vitamin B-12 (VITAMIN B12) 1000 MCG Oral Tab, Take 2 tablets (2,000 mcg total) by mouth daily., Disp: , Rfl:     Multiple Vitamin (MULTI-VITAMIN OR), Take 1 tablet by mouth daily., Disp: , Rfl:     predniSONE 20 MG Oral Tab, 3 tab daily for 5 days, then 2 tab daily for 5 days then 1 tab daily for 5 days then stop (Patient not taking: Reported on 6/10/2025), Disp: 30 tablet, Rfl: 0    Urea 40 % External Cream, Apply to bilateral feet nightly., Disp: 80 g, Rfl: 3    Betamethasone Dipropionate 0.05 % External Ointment, Apply to AA on the fissures of the bilateral feet BID for 14 days, Disp: 50 g, Rfl: 2    Urea 20 % External Cream, Apply bid to feet., Disp: 80 g, Rfl: 3    UTOPIC 41 % External Cream, Apply 1 Application topically 2 (two) times daily. To feet., Disp: 227 g, Rfl: 5    Clobetasol Propionate 0.05 % External Ointment, Apply to AA on feet BID x 2 weeks, Disp: 45 g, Rfl: 1    Econazole Nitrate 1 % External Cream, Apply to AA on feet BID (Patient not taking: Reported on 6/10/2025), Disp: 30 g, Rfl: 1  [2] [3] No Known Allergies

## (undated) NOTE — LETTER
05/01/2019            [address] [id number]      Dear Surendra Gutierrez,     We are contacting you from Dr. Jr Vazquez office. Your health is important to us.  We have not received test results for additional tests that your provid

## (undated) NOTE — MR AVS SNAPSHOT
[address] [facility] [address] [phone]               Thank you for choosing us for your health care visit with NEAL Lagunas.   We are glad to serve you and happy to provide you with this summary of your visit ? To best provide you care, patients receiving routine medications need to be seen at least once a year.  protocol for controlled substances:  Written prescriptions      ?  EFFECTIVE April 1, 2017 PATIENTS MUST  THEIR OWN NARCOTIC PRESCRIPT This list is accurate as of: 4/18/17  4:58 PM.  Always use your most recent med list.                ADVAIR DISKUS 100-50 MCG/DOSE Aepb   Generic drug:  fluticasone-salmeterol   Inhale 1 puff into the lungs daily.            Fluticasone 50 MCG/BLIST Aepb

## (undated) NOTE — MR AVS SNAPSHOT
[facility] [address] [facility] [address] [phone]               Thank you for choosing us for your health care visit with Desiree Yoon MD.  We are glad to serve you and happy to provide you with this summary of your v ? If your prescription is due for a refill, you may be due for a follow up appointment. ? To best provide you care, patients receiving routine medications need to be seen at least once a year.      protocol for controlled substances:  Written prescr understanding in the matter.            Allergies as of Mar 17, 2017     No Known Allergies                Today's Vital Signs     BP Pulse Height Weight BMI    130/86 mmHg 77 68\" 150 lb 22.81 kg/m2         Current Medications          This list is accurat Call [phone] for help. [employer] is NOT to be used for urgent needs. For medical emergencies, dial 911.            Visit [facility] online at  [email or web address]